# Patient Record
Sex: FEMALE | Race: WHITE | URBAN - METROPOLITAN AREA
[De-identification: names, ages, dates, MRNs, and addresses within clinical notes are randomized per-mention and may not be internally consistent; named-entity substitution may affect disease eponyms.]

---

## 2019-01-09 ENCOUNTER — HOSPITAL ENCOUNTER (OUTPATIENT)
Dept: LAB | Age: 24
Discharge: HOME | End: 2019-01-09

## 2019-01-09 ENCOUNTER — DOCUMENTATION ONLY (OUTPATIENT)
Dept: SURGERY | Age: 24
End: 2019-01-09

## 2019-01-09 ENCOUNTER — OFFICE VISIT (OUTPATIENT)
Dept: SURGERY | Age: 24
End: 2019-01-09

## 2019-01-09 VITALS
HEART RATE: 105 BPM | WEIGHT: 283 LBS | DIASTOLIC BLOOD PRESSURE: 79 MMHG | BODY MASS INDEX: 41.92 KG/M2 | HEIGHT: 69 IN | SYSTOLIC BLOOD PRESSURE: 129 MMHG

## 2019-01-09 DIAGNOSIS — N63.10 MASS OF BREAST, RIGHT: Primary | ICD-10-CM

## 2019-01-09 DIAGNOSIS — R92.8 ABNORMAL ULTRASOUND OF BREAST: ICD-10-CM

## 2019-01-09 PROBLEM — E66.01 OBESITY, MORBID (HCC): Status: ACTIVE | Noted: 2019-01-09

## 2019-01-09 NOTE — LETTER
1/15/2019 9:48 AM 
 
Patient:  María Elena Barcenas YOB: 1995 Date of Visit: 1/9/2019 Dear Dr. Driss Burnett: Thank you for referring Ms. Shraddha Salazarite to me for evaluation/treatment. Below are the relevant portions of my assessment and plan of care. HISTORY OF PRESENT ILLNESS María Elena Barcenas is a 20 y.o. female. HPI 
NEW patient presents for consultation at the request of Benedicto Melo MD for palpable RIGHT breast lump that she noticed in early October. She says that this is oval in shape, is non-tender, however does feel \"uncomfortable\" to her. Changes in size with her periods. This is the first breast lump that she has ever had. Has no nipple discharge/retraction or skin change. FH is significant for a maternal grandmother who had breast cancer at about age 66, paternal grandmother had breast cancer in her late 46s. She has not had any breast imaging.   
   
No past medical history on file. Past Surgical History:  
Procedure Laterality Date  [facility]THOPAEDIC  2013 Knee surgery Social History Socioeconomic History  Marital status: SINGLE Spouse name: Not on file  Number of children: Not on file  Years of education: Not on file  Highest education level: Not on file Social Needs  Financial resource strain: Not on file  Food insecurity - worry: Not on file  Food insecurity - inability: Not on file  Transportation needs - medical: Not on file  Transportation needs - non-medical: Not on file Occupational History  Not on file Tobacco Use  Smoking status: Never Smoker  Smokeless tobacco: Never Used Substance and Sexual Activity  Alcohol use: Yes  Drug use: No  
 Sexual activity: No  
Other Topics Concern  Not on file Social History Narrative  Not on file No current outpatient medications on file prior to visit. No current facility-administered medications on file prior to visit. No Known Allergies OB History Obstetric Comments Menarche 13, LMP 12/16/18, # of children 0, age of 1st delivery N/A, Hysterectomy/oophorectomy No/No/, Breast bx No, history of breast feeding N/A, BCP No, Hormone therapy No 
  
  
 
ROS Constitutional: Negative. HENT: Negative. Eyes: Negative. Respiratory: Negative. Cardiovascular: Negative. Gastrointestinal: Negative. Genitourinary: Negative. Musculoskeletal: Negative. Skin: Negative. Neurological: Negative. Endo/Heme/Allergies: Negative. Psychiatric/Behavioral: Negative. Physical Exam  
Cardiovascular: Normal rate, regular rhythm and normal heart sounds. Pulmonary/Chest: Breath sounds normal. Right breast exhibits mass. Right breast exhibits no inverted nipple, no nipple discharge, no skin change and no tenderness. Left breast exhibits no inverted nipple, no mass, no nipple discharge, no skin change and no tenderness. Breasts are symmetrical.  
 
 
Lymphadenopathy:  
     Right cervical: No superficial cervical, no deep cervical and no posterior cervical adenopathy present. Left cervical: No superficial cervical, no deep cervical and no posterior cervical adenopathy present. She has no axillary adenopathy. Right axillary: No pectoral and no lateral adenopathy present. Left axillary: No pectoral and no lateral adenopathy present. BREAST ULTRASOUND Indication: Right breast mass UIQ Technique: The area was scanned using a high-frequency linear-array near-field transducer Findings: 1.5cm hypoechoic mass with shadowing Impression: Suspicious mass Disposition: Ultrasound-guided biopsy performed US - Guided Core Biopsy Following detailed explanation and  description of the Biopsy procedure, its risk, benefits and possible alternatives, the patient signed the informed consent. Indication : Mass, Ultrasound Visible, right Breast upper inner quadrant Prep : We cleansed the skin with alcohol. Anesthesia : We anesthetized the skin and underlying tissues with 1% lidocaine with epinephrine. Device : We advanced the BARD Marquee device through the lesion and captured tissue with real-time Ultrasound Confirmation. Core Sampling : We repeated this sampling for the following number of cores, 3. Marker : We placed a marking clip to carloz the biopsy site. Marker Type : UltraCor Twirl. Dressing : We then closed the incision with steristrips and placed a sterile dressing. Instructions : The patient was instructed regarding post-procedure care and activities. Pathology : Pending at this time. Patient tolerated procedure well and discharged in stable condition. Informed patient that they will be notified of pathology results in 3 to 5 days. ASSESSMENT and PLAN 
  ICD-10-CM ICD-9-CM 1. Mass of breast, right N63.10 611.72 SURGICAL PATHOLOGY 2. Abnormal ultrasound of breast R92.8 793.89 New patient presents for evaluation of RIGHT breast lump, which pt states is \"uncomfortable\" around her period. Palpable mass on exam at RIGHT breast UIQ. US visualizes 1.5cm hypoechoic mass with shadowing. Discussed bx of this mass and pt agreed to proceed. She tolerated the procedure well, and 3 cores were taken, UltraCor Twirl marker placed. Will send for PATH and f/u with results. This plan was reviewed with the patient and patient agrees. All questions were answered. Written by Jennifer Cummings, as dictated by Dr. Jannet Girard MD. 
 
If you have questions, please do not hesitate to call me. I look forward to following Ms. Caballero along with you.  
 
 
 
Sincerely, 
 
 
Darcy Cooks., MD

## 2019-01-09 NOTE — PROGRESS NOTES
HISTORY OF PRESENT ILLNESS  Gisell Vega is a 20 y.o. female. HPI  NEW patient presents for consultation at the request of Emory Greene MD for palpable RIGHT breast lump that she noticed in early October. She says that this is oval in shape, is non-tender, however does feel \"uncomfortable\" to her. Changes in size with her periods. This is the first breast lump that she has ever had. Has no nipple discharge/retraction or skin change. FH is significant for a maternal grandmother who had breast cancer at about age 66, paternal grandmother had breast cancer in her late 46s. She has not had any breast imaging.        No past medical history on file. Past Surgical History:   Procedure Laterality Date   [facility]  2013    Knee surgery       Social History     Socioeconomic History    Marital status: SINGLE     Spouse name: Not on file    Number of children: Not on file    Years of education: Not on file    Highest education level: Not on file   Social Needs    Financial resource strain: Not on file    Food insecurity - worry: Not on file    Food insecurity - inability: Not on file    Transportation needs - medical: Not on file   [facility] needs - non-medical: Not on file   Occupational History    Not on file   Tobacco Use    Smoking status: Never Smoker    Smokeless tobacco: Never Used   Substance and Sexual Activity    Alcohol use: Yes    Drug use: No    Sexual activity: No   Other Topics Concern    Not on file   Social History Narrative    Not on file       No current outpatient medications on file prior to visit. No current facility-administered medications on file prior to visit. No Known Allergies    OB History     Obstetric Comments    Menarche 13, LMP 12/16/18, # of children 0, age of 1st delivery N/A, Hysterectomy/oophorectomy No/No/, Breast bx No, history of breast feeding N/A, BCP No, Hormone therapy No          ROS  Constitutional: Negative. HENT: Negative. Eyes: Negative. Respiratory: Negative. Cardiovascular: Negative. Gastrointestinal: Negative. Genitourinary: Negative. Musculoskeletal: Negative. Skin: Negative. Neurological: Negative. Endo/Heme/Allergies: Negative. Psychiatric/Behavioral: Negative. Physical Exam   Cardiovascular: Normal rate, regular rhythm and normal heart sounds. Pulmonary/Chest: Breath sounds normal. Right breast exhibits mass. Right breast exhibits no inverted nipple, no nipple discharge, no skin change and no tenderness. Left breast exhibits no inverted nipple, no mass, no nipple discharge, no skin change and no tenderness. Breasts are symmetrical.       Lymphadenopathy:        Right cervical: No superficial cervical, no deep cervical and no posterior cervical adenopathy present. Left cervical: No superficial cervical, no deep cervical and no posterior cervical adenopathy present. She has no axillary adenopathy. Right axillary: No pectoral and no lateral adenopathy present. Left axillary: No pectoral and no lateral adenopathy present. BREAST ULTRASOUND  Indication: Right breast mass UIQ  Technique: The area was scanned using a high-frequency linear-array near-field transducer  Findings: 1.5cm hypoechoic mass with shadowing  Impression: Suspicious mass  Disposition: Ultrasound-guided biopsy performed    US - Guided Core Biopsy  Following detailed explanation and  description of the Biopsy procedure, its risk, benefits and possible alternatives, the patient signed the informed consent. Indication : Mass, Ultrasound Visible, right Breast upper inner quadrant   Prep : We cleansed the skin with alcohol. Anesthesia : We anesthetized the skin and underlying tissues with 1% lidocaine with epinephrine. Device : We advanced the BARD Marquee device through the lesion and captured tissue with real-time Ultrasound Confirmation. Core Sampling :  We repeated this sampling for the following number of cores, 3. Marker : We placed a marking clip to carloz the biopsy site. Marker Type : UltraCor Twirl. Dressing : We then closed the incision with steristrips and placed a sterile dressing. Instructions : The patient was instructed regarding post-procedure care and activities. Pathology : Pending at this time. Patient tolerated procedure well and discharged in stable condition. Informed patient that they will be notified of pathology results in 3 to 5 days. ASSESSMENT and PLAN    ICD-10-CM ICD-9-CM    1. Mass of breast, right N63.10 611.72 SURGICAL PATHOLOGY   2. Abnormal ultrasound of breast R92.8 793.89       New patient presents for evaluation of RIGHT breast lump, which pt states is \"uncomfortable\" around her period. Palpable mass on exam at RIGHT breast UIQ. US visualizes 1.5cm hypoechoic mass with shadowing. Discussed bx of this mass and pt agreed to proceed. She tolerated the procedure well, and 3 cores were taken, UltraCor Twirl marker placed. Will send for PATH and f/u with results. This plan was reviewed with the patient and patient agrees. All questions were answered.     Written by Santo Barraza, as dictated by Dr. Ramona Briseno MD.

## 2019-01-09 NOTE — PROGRESS NOTES
[facility]  OFFICE PROCEDURE PROGRESS NOTE        Chart reviewed for the following:   Mari Germain MD, have reviewed the History, Physical and updated the Allergic reactions for [address] [place] [address] performed immediately prior to start of procedure:   Mari Germain MD, have performed the following reviews on [facility] Cons prior to the start of the procedure:            * Patient was identified by name and date of birth   * Agreement on procedure being performed was verified  * Risks and Benefits explained to the patient  * Procedure site verified and marked as necessary  * Patient was positioned for comfort  * Consent was signed and verified     Time:  4:34 pm      Date of procedure: 1/9/2019    Procedure performed by:  Yessica García MD    Provider assisted by:  Romulo Grider  RN    Patient assisted by: self    How tolerated by patient: tolerated the procedure well with no complications    Post Procedural Pain Scale: 0 - No Hurt    Comments:  Written and verbal post biopsy instructions reviewed with and given to patient with her understanding.

## 2019-01-09 NOTE — PROGRESS NOTES
HISTORY OF PRESENT ILLNESS Ghislaine Walker is a 20 y.o. female. HPI    NEW patient presents for consultation at the request of Elidia Pierre MD for palpable RIGHT breast lump that she noticed in early October. She says that this is oval in shape, is non-tender, however does feel \"uncomfortable\" to her. Changes in size with her periods. This is the first breast lump that she has ever had. Has no nipple discharge/retraction or skin change. FH is significant for a maternal grandmother who had breast cancer at about age 66, paternal grandmother had breast cancer in her late 46s. She has not had any breast imaging. Review of Systems Constitutional: Negative. HENT: Negative. Eyes: Negative. Respiratory: Negative. Cardiovascular: Negative. Gastrointestinal: Negative. Genitourinary: Negative. Musculoskeletal: Negative. Skin: Negative. Neurological: Negative. Endo/Heme/Allergies: Negative. Psychiatric/Behavioral: Negative. Physical Exam 
 
ASSESSMENT and PLAN 
{ASSESSMENT/PLAN:75080}

## 2019-01-13 ENCOUNTER — TELEPHONE (OUTPATIENT)
Dept: SURGERY | Age: 24
End: 2019-01-13

## 2019-01-15 NOTE — COMMUNICATION BODY
HISTORY OF PRESENT ILLNESS  Alessio Lane is a 20 y.o. female. HPI  NEW patient presents for consultation at the request of Pearla Brittle, MD for palpable RIGHT breast lump that she noticed in early October. She says that this is oval in shape, is non-tender, however does feel \"uncomfortable\" to her. Changes in size with her periods. This is the first breast lump that she has ever had. Has no nipple discharge/retraction or skin change. FH is significant for a maternal grandmother who had breast cancer at about age 66, paternal grandmother had breast cancer in her late 46s. She has not had any breast imaging.        No past medical history on file. Past Surgical History:   Procedure Laterality Date   [facility]  2013    Knee surgery       Social History     Socioeconomic History    Marital status: SINGLE     Spouse name: Not on file    Number of children: Not on file    Years of education: Not on file    Highest education level: Not on file   Social Needs    Financial resource strain: Not on file    Food insecurity - worry: Not on file    Food insecurity - inability: Not on file    Transportation needs - medical: Not on file   [facility] needs - non-medical: Not on file   Occupational History    Not on file   Tobacco Use    Smoking status: Never Smoker    Smokeless tobacco: Never Used   Substance and Sexual Activity    Alcohol use: Yes    Drug use: No    Sexual activity: No   Other Topics Concern    Not on file   Social History Narrative    Not on file       No current outpatient medications on file prior to visit. No current facility-administered medications on file prior to visit. No Known Allergies    OB History     Obstetric Comments    Menarche 13, LMP 12/16/18, # of children 0, age of 1st delivery N/A, Hysterectomy/oophorectomy No/No/, Breast bx No, history of breast feeding N/A, BCP No, Hormone therapy No          ROS  Constitutional: Negative. HENT: Negative. Eyes: Negative. Respiratory: Negative. Cardiovascular: Negative. Gastrointestinal: Negative. Genitourinary: Negative. Musculoskeletal: Negative. Skin: Negative. Neurological: Negative. Endo/Heme/Allergies: Negative. Psychiatric/Behavioral: Negative. Physical Exam   Cardiovascular: Normal rate, regular rhythm and normal heart sounds. Pulmonary/Chest: Breath sounds normal. Right breast exhibits mass. Right breast exhibits no inverted nipple, no nipple discharge, no skin change and no tenderness. Left breast exhibits no inverted nipple, no mass, no nipple discharge, no skin change and no tenderness. Breasts are symmetrical.       Lymphadenopathy:        Right cervical: No superficial cervical, no deep cervical and no posterior cervical adenopathy present. Left cervical: No superficial cervical, no deep cervical and no posterior cervical adenopathy present. She has no axillary adenopathy. Right axillary: No pectoral and no lateral adenopathy present. Left axillary: No pectoral and no lateral adenopathy present. BREAST ULTRASOUND  Indication: Right breast mass UIQ  Technique: The area was scanned using a high-frequency linear-array near-field transducer  Findings: 1.5cm hypoechoic mass with shadowing  Impression: Suspicious mass  Disposition: Ultrasound-guided biopsy performed    US - Guided Core Biopsy  Following detailed explanation and  description of the Biopsy procedure, its risk, benefits and possible alternatives, the patient signed the informed consent. Indication : Mass, Ultrasound Visible, right Breast upper inner quadrant   Prep : We cleansed the skin with alcohol. Anesthesia : We anesthetized the skin and underlying tissues with 1% lidocaine with epinephrine. Device : We advanced the BARD Marquee device through the lesion and captured tissue with real-time Ultrasound Confirmation. Core Sampling :  We repeated this sampling for the following number of cores, 3. Marker : We placed a marking clip to carloz the biopsy site. Marker Type : UltraCor Twirl. Dressing : We then closed the incision with steristrips and placed a sterile dressing. Instructions : The patient was instructed regarding post-procedure care and activities. Pathology : Pending at this time. Patient tolerated procedure well and discharged in stable condition. Informed patient that they will be notified of pathology results in 3 to 5 days. ASSESSMENT and PLAN    ICD-10-CM ICD-9-CM    1. Mass of breast, right N63.10 611.72 SURGICAL PATHOLOGY   2. Abnormal ultrasound of breast R92.8 793.89       New patient presents for evaluation of RIGHT breast lump, which pt states is \"uncomfortable\" around her period. Palpable mass on exam at RIGHT breast UIQ. US visualizes 1.5cm hypoechoic mass with shadowing. Discussed bx of this mass and pt agreed to proceed. She tolerated the procedure well, and 3 cores were taken, UltraCor Twirl marker placed. Will send for PATH and f/u with results. This plan was reviewed with the patient and patient agrees. All questions were answered.     Written by Kwaku Darby, as dictated by Dr. Leida Gr MD.